# Patient Record
Sex: MALE | URBAN - METROPOLITAN AREA
[De-identification: names, ages, dates, MRNs, and addresses within clinical notes are randomized per-mention and may not be internally consistent; named-entity substitution may affect disease eponyms.]

---

## 2020-07-15 ENCOUNTER — EMERGENCY (EMERGENCY)
Facility: HOSPITAL | Age: 50
LOS: 1 days | Discharge: HOME | End: 2020-07-15
Attending: EMERGENCY MEDICINE | Admitting: EMERGENCY MEDICINE
Payer: MEDICAID

## 2020-07-15 VITALS
TEMPERATURE: 99 F | DIASTOLIC BLOOD PRESSURE: 85 MMHG | RESPIRATION RATE: 15 BRPM | HEART RATE: 88 BPM | SYSTOLIC BLOOD PRESSURE: 124 MMHG | OXYGEN SATURATION: 98 %

## 2020-07-15 PROCEDURE — 99283 EMERGENCY DEPT VISIT LOW MDM: CPT

## 2020-07-15 NOTE — ED PROVIDER NOTE - ATTENDING CONTRIBUTION TO CARE
agree with PA note    "51 y/o M denies pmh c/o R sided tooth pain x 7 years, worsening x yesterday. States his R central incisor cracked a while back, yesterday when he bit into something he felt like his tooth got pushed up. States he took tylenol and numerous NSAIDs without relief. Pt Denies fever, chills. Has no other complaints."    PE: well appearing; VSS; TTP over right 2nd incisor #7; no swelling; no abscess; minimal gingival swelling    Imp: periodontitis; dental to see

## 2020-07-15 NOTE — ED PROVIDER NOTE - CLINICAL SUMMARY MEDICAL DECISION MAKING FREE TEXT BOX
49 y/o M denies pmh c/o R sided tooth pain x 7 years, worsening x yesterday. States his R central incisor cracked a while back, yesterday when he bit into something he felt like his tooth got pushed up.    Pt seen by dental, state no intervention at this time. Pt does not need antibx. Pt will need to f/u at private clinic as dental clinic is not accepting new pts.  Pt upset that he wasted his time for no intervention, offered opioids to control pain until pt sees a dentist but pt refused.

## 2020-07-15 NOTE — ED PROVIDER NOTE - PHYSICAL EXAMINATION
mouth: poor dentition, multiple missing teeth, R central incisor cracked. induration felt top of lip

## 2020-07-15 NOTE — PROGRESS NOTE ADULT - SUBJECTIVE AND OBJECTIVE BOX
CC: 49 y/o presents with pain in the with no associated swelling. Pain on upper right front area (points to area of #7). Patient reports that pain started yesterday.     Med HX: Tooth ache  DENTAL: secondary dentition with multiple missing teeth, multiple carious lesions     Social Hx: non-contributory    EOE:   TMJ (WNL)  Trismus (-)  LAD (-)  Dysphagia (-)  Swelling (-)  Limited opening due to pain in the anterior region of his mouth     IOE: Permanent dentition.   Hard/Soft palate (WNL)  Tongue/Floor of Mouth (WNL)  Buccal Mucosa (WNL)  Percussion (+)  Palpation (+)  Mobility (-)   Swelling (-)    Radiographs: Pa taken of #7 and evaluated. PARL noted around the apex.     Assessment: Acute apical periodontitis of #7 with PARL. No swelling associated with tooth.     Treatment: Discussed clinical and radiographic findings with patient. No treatment indicated at this time due to no associated facial or gingival swelling, abscess present, or fistula present. Recommended patient be referred to either outpatient private dentist or [facility] adult dental for comprehensive dental care. All questions answered. Patient was agitated that no treatment could be performed.     Recommendations:   1. OTC pain medications as needed.  2. Seek comprehensive dental care with outpatient private dentist or [facility] adult dental clinic [phone].  5. If any difficulty breathing/swallowing or fever and swelling occur, return to ED.    Iona Nicholson DDS #[id number] CC: 51 y/o presents with pain in the with no associated swelling. Pain on upper right front area (points to area of #7). Patient reports that pain started yesterday. Pt reports no difficulty breathing, difficulty swallowing, no nausea, no vomiting, no SOB.     Med HX: Tooth ache  DENTAL: secondary dentition with multiple missing teeth, multiple carious lesions     Social Hx: non-contributory    EOE: WNL  TMJ (WNL)  Trismus (-)  LAD (-)  Dysphagia (-)  Swelling (-)  Limited opening due to pain in the anterior region of his mouth     IOE: Permanent dentition.   #7: grossly carious (+) palpation (+) percussion (-) mobility (-) swelling  Hard/Soft palate (WNL)  Tongue/Floor of Mouth (WNL)  Buccal Mucosa (WNL)    Radiographs: Pa taken of #7  Radiographic assessment: #7 PARL noted around the apex.     Assessment: Acute apical periodontitis of #7 with PARL. No swelling associated with tooth.     Treatment: Discussed clinical and radiographic findings with patient. No treatment indicated at this time due to no associated facial or gingival swelling, abscess present, or fistula present. Recommended patient be referred to either outpatient private dentist or [facility] adult dental for comprehensive dental care. Patient was agitated that no treatment could be performed. Discussed PA about pain meds for pt. All questions answered.    Recommendations:   1. OTC pain medications as needed.  2. Seek comprehensive dental care with outpatient private dentist or [facility] adult dental clinic [phone].  3. If any difficulty breathing/swallowing or fever and swelling occur, return to ED.    Iona Nicholson DDS #[id number]

## 2020-07-15 NOTE — ED PROVIDER NOTE - NSFOLLOWUPINSTRUCTIONS_ED_ALL_ED_FT
Please follow up with a dentist ASAP.    Advance activity as tolerated.  Continue all previously prescribed medications as directed unless otherwise instructed.  Follow up with your primary care physician in 48-72 hours- bring copies of your results.  Return to the ER for worsening or persistent symptoms, and/or ANY NEW OR CONCERNING SYMPTOMS. If you have issues obtaining follow up, please call: [phone] to obtain a doctor or specialist who takes your insurance in your area.  You may call [phone] to make an appointment with the internal medicine clinic.

## 2020-07-15 NOTE — ED PROVIDER NOTE - PROGRESS NOTE DETAILS
Pt upset dental cannot perform a tooth extraction. Offered narcotics for pain control, pt refused. States he would like to go home. Pt d/c.

## 2020-07-15 NOTE — ED PROVIDER NOTE - OBJECTIVE STATEMENT
49 y/o M denies pmh c/o R sided tooth pain x 7 years, worsening x yesterday. 51 y/o M denies pmh c/o R sided tooth pain x 7 years, worsening x yesterday. States his R central incisor cracked a while back, yesterday when he bit into something he felt like his tooth got pushed up. States he took tylenol and numerous NSAIDs without relief. Pt Denies fever, chills. Has no other complaints.

## 2020-07-15 NOTE — ED PROVIDER NOTE - PATIENT PORTAL LINK FT
You can access the FollowMyHealth Patient Portal offered by [facility] by registering at the following website: [email or web address]. By joining [facility]’s FollowMyHealth portal, you will also be able to view your health information using other applications (apps) compatible with our system.

## 2020-07-15 NOTE — ED ADULT TRIAGE NOTE - CHIEF COMPLAINT QUOTE
pt c/o pain to the right side of mouth after eating something last night and "pushing his tooth up into the jaw". Tooth noted to be chipped, pt states the tooth has always been chipped but feels like he may have done something to it after eating. Denies fevers, chills, SOB, CP. Pt took motrin and tylenol at 12pm with no relief. Appears comfortable.